# Patient Record
Sex: FEMALE | Race: OTHER | NOT HISPANIC OR LATINO | URBAN - METROPOLITAN AREA
[De-identification: names, ages, dates, MRNs, and addresses within clinical notes are randomized per-mention and may not be internally consistent; named-entity substitution may affect disease eponyms.]

---

## 2018-07-10 ENCOUNTER — EMERGENCY (EMERGENCY)
Facility: HOSPITAL | Age: 33
LOS: 1 days | Discharge: HOME | End: 2018-07-10
Attending: EMERGENCY MEDICINE | Admitting: EMERGENCY MEDICINE
Payer: COMMERCIAL

## 2018-07-10 VITALS
SYSTOLIC BLOOD PRESSURE: 121 MMHG | HEART RATE: 80 BPM | OXYGEN SATURATION: 100 % | DIASTOLIC BLOOD PRESSURE: 64 MMHG | RESPIRATION RATE: 20 BRPM | TEMPERATURE: 98 F

## 2018-07-10 DIAGNOSIS — R20.2 PARESTHESIA OF SKIN: ICD-10-CM

## 2018-07-10 DIAGNOSIS — R20.9 UNSPECIFIED DISTURBANCES OF SKIN SENSATION: ICD-10-CM

## 2018-07-10 PROCEDURE — 70450 CT HEAD/BRAIN W/O DYE: CPT | Mod: 26

## 2018-07-10 PROCEDURE — 99284 EMERGENCY DEPT VISIT MOD MDM: CPT | Mod: 25

## 2018-07-10 NOTE — ED ADULT TRIAGE NOTE - CHIEF COMPLAINT QUOTE
Walkin patient with complaints of left pain and intermittent numbness up to hip tonight. P/s had sclerotherapy done on 6/22 for varicose vein removal. +sensation, no skin discoloration. No difficulty ambulating.

## 2018-07-10 NOTE — ED PROVIDER NOTE - MEDICAL DECISION MAKING DETAILS
left sided paresthesia w/ dec sensation, strengths intact, no cerebellar findings, no hx of DM/HTN/HL, nonsmoker, will CT head, afebrile, no back pain, low suspicion for acute transverse myelitis, likely will need outpatient neurology follow up,

## 2018-07-10 NOTE — ED PROVIDER NOTE - PHYSICAL EXAMINATION
CON: ao x 3, HENMT: clear oropharynx, soft neck, HEAD: atraumatic, CV: rrr, RESP: cta b/l, SKIN: no rash, MSK: no deformities, NEURO: perrl, full EOMI, f-n normal, neg pronator, neg romberg, strength 5/5, normal gait, no dysmetria, no dysdiadochokinesia, reported dec sensation to left extremity and left face, able to differentiate sharp and dull objects but less sensitive on left side

## 2018-07-10 NOTE — ED ADULT NURSE NOTE - OBJECTIVE STATEMENT
pt is a 34 y/o female who comes into the ED c/o left leg numbness and tingling. pt states "I had sclerotherapy done on june 22nd and since wednesday I have noticed shooting pains from my left leg to my left hip, It also feels tingling and numb at times". pt denies any other symptoms, no swelling or discoloration noted.

## 2018-07-10 NOTE — ED PROVIDER NOTE - OBJECTIVE STATEMENT
33 yof pw paresthesia to left leg, from ankle and up.  first noted sx on Wednesday, felt improved, but recurred last night.  no weakness.  recent sclerotherapy for varicose vein.  no fc, no cough, no back pain, no focal weakness, no IVDU, no smoking, on OCP for years, no HA, no blurry vision, no recent travel/immobilization, no bloody stool.  states father recently dx w/ some neurologic condition w/ headache but no one else.  no bladder/bowel changes.  no recent URI sx.

## 2019-01-30 ENCOUNTER — EMERGENCY (EMERGENCY)
Facility: HOSPITAL | Age: 34
LOS: 1 days | Discharge: HOME | End: 2019-01-30
Attending: EMERGENCY MEDICINE | Admitting: EMERGENCY MEDICINE
Payer: COMMERCIAL

## 2019-01-30 VITALS
SYSTOLIC BLOOD PRESSURE: 112 MMHG | TEMPERATURE: 98 F | DIASTOLIC BLOOD PRESSURE: 75 MMHG | HEART RATE: 97 BPM | RESPIRATION RATE: 75 BRPM | OXYGEN SATURATION: 98 %

## 2019-01-30 PROCEDURE — 73080 X-RAY EXAM OF ELBOW: CPT | Mod: 26,LT

## 2019-01-30 PROCEDURE — 99283 EMERGENCY DEPT VISIT LOW MDM: CPT | Mod: 25

## 2019-01-30 NOTE — ED PROVIDER NOTE - MEDICAL DECISION MAKING DETAILS
LHD female with fall + L elbow injury.  Minimal edema without bony tenderness.  Normal distal neurovascular exam.  X-ray with possible occult radial head fracture.  Sling, ice, pain medication as needed.  Follow up with orthopedics if symptoms do not improve in one week
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
## 2019-01-30 NOTE — ED PROVIDER NOTE - CARE PROVIDER_API CALL
Kashmir Gupta)  Orthopaedic Surgery  [address] [place] [address]  Phone: [phone]  Fax: [phone]

## 2019-01-30 NOTE — ED PROVIDER NOTE - DIAGNOSTIC INTERPRETATION
ER Physician: Malcom Perla  INTERPRETATION:  no acute fracture; + soft tissue swelling noted; normal bony alignment.

## 2019-01-30 NOTE — ED PROVIDER NOTE - MUSCULOSKELETAL, MLM
LUE - no pain with wrist extension, supination or pronation.  Pain with flexion at the elbow.  Minimal edema around the elbow without bony tenderness.  No proximal humerus tenderness.  Normal distal motor and sensory of the median, ulnar and radial nerves.

## 2019-01-30 NOTE — ED PROVIDER NOTE - NSFOLLOWUPINSTRUCTIONS_ED_ALL_ED_FT
KEEP Sling IN PLACE FOR 7 DAYS FOR COMFORT AND SWELLING.   APPLY ICE FOR 15-20 MINUTES EVERY 1-2 HOURS FOR SWELLING AND COMFORT.   TAKE IBUPROFEN 600MG EVERY 6 HOURS WITH FOOD AS NEEDED FOR PAIN FOR 2-3 DAYS.   TAKE TYLENOL 650MG EVERY 6 HOURS AS NEEDED FOR PAIN FOR 2-3 DAYS.   RETURN TO ER FOR ANY NEW OR CONCERNING SYMPTOMS.   IF PAIN IS NOT IMPROVING IN ONE WEEK, FOLLOW UP WITH DR. LOONEY

## 2019-01-30 NOTE — ED ADULT NURSE NOTE - NSIMPLEMENTINTERV_GEN_ALL_ED
Implemented All Universal Safety Interventions:  [place] to call system. Call bell, personal items and telephone within reach. Instruct patient to call for assistance. Room bathroom lighting operational. Non-slip footwear when patient is off stretcher. Physically safe environment: no spills, clutter or unnecessary equipment. Stretcher in lowest position, wheels locked, appropriate side rails in place.
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
## 2019-02-03 DIAGNOSIS — M25.522 PAIN IN LEFT ELBOW: ICD-10-CM

## 2019-02-03 DIAGNOSIS — S59.902A UNSPECIFIED INJURY OF LEFT ELBOW, INITIAL ENCOUNTER: ICD-10-CM

## 2020-01-06 ENCOUNTER — OUTPATIENT (OUTPATIENT)
Dept: OUTPATIENT SERVICES | Facility: HOSPITAL | Age: 35
LOS: 1 days | Discharge: HOME | End: 2020-01-06
Payer: COMMERCIAL

## 2020-01-06 PROCEDURE — 88300 SURGICAL PATH GROSS: CPT | Mod: 26

## 2020-01-10 LAB — SURGICAL PATHOLOGY STUDY: SIGNIFICANT CHANGE UP

## 2021-07-13 ENCOUNTER — EMERGENCY (EMERGENCY)
Facility: HOSPITAL | Age: 36
LOS: 1 days | Discharge: HOME | End: 2021-07-13
Admitting: EMERGENCY MEDICINE
Payer: COMMERCIAL

## 2021-07-13 VITALS
HEART RATE: 81 BPM | RESPIRATION RATE: 16 BRPM | OXYGEN SATURATION: 98 % | DIASTOLIC BLOOD PRESSURE: 65 MMHG | SYSTOLIC BLOOD PRESSURE: 105 MMHG | HEIGHT: 66 IN | TEMPERATURE: 98 F | WEIGHT: 134.92 LBS
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
## 2021-07-13 DIAGNOSIS — S60.131A CONTUSION OF RIGHT MIDDLE FINGER WITH DAMAGE TO NAIL, INITIAL ENCOUNTER: ICD-10-CM

## 2021-07-13 DIAGNOSIS — M79.644 PAIN IN RIGHT FINGER(S): ICD-10-CM
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
## 2021-07-13 PROCEDURE — 99283 EMERGENCY DEPT VISIT LOW MDM: CPT | Mod: 25

## 2021-07-13 PROCEDURE — 73140 X-RAY EXAM OF FINGER(S): CPT | Mod: 26,RT

## 2021-07-13 PROCEDURE — 11740 EVACUATION SUBUNGUAL HMTMA: CPT

## 2021-07-13 NOTE — ED PROVIDER NOTE - CLINICAL SUMMARY MEDICAL DECISION MAKING FREE TEXT BOX
37 yo LHD, otherwise healthy F presents to the ED c/o R middle finger pain s/p slamming in car door last night. Pt has been treating pain without improvement. Pt with +subungual hematoma extending from nail fold residential up nail, with swelling and tenderness to R middle distal phalanx. Will obtain x-ray of digit; anticipate possible trephination.

## 2021-07-13 NOTE — ED PROVIDER NOTE - SKIN, MLM
manicure has grown out showing +subungual hematoma extending from nail fold intermediate up nail, nailfold intact, +tenderness to fingerpad and nail, skin otherwise intact. Capillary refill intact <2secs.

## 2021-07-13 NOTE — ED PROVIDER NOTE - MUSCULOSKELETAL, MLM
RUE: +swelling and tenderness to R middle distal phalanx RUE: +swelling and tenderness to R middle distal phalanx with small ecchymosis over finger pad

## 2021-07-13 NOTE — ED PROVIDER NOTE - NSFOLLOWUPINSTRUCTIONS_ED_ALL_ED_FT
YOU HAVE A SMALL INJURY TO THE NAIL BED WHICH WAS BLEEDING UNDER THE NAIL. WE POKED A SMALL HOLE IN THE NAIL WHICH RELEASED SOME OF THE BLOOD AND FLUID, RELEASING THE PRESSURE.     THE X RAY SHOWED NO SIGNS OF FRACTURE.     OKAY TO SOAK THE FINGER IN WARM WATER TO HELP KEEP IT DRAINING TODAY.     THEN APPLY ANTIBIOTIC OINTMENT AND A BANDAID ONCE IT STOPS DRAINING.     TAKE ANTIBIOTIC (DURICEF TWICE DAILY) AS PRESCRIBED TO PREVENT INFECTION OF THE AREA.     FOLLOW UP WITH YOUR PMD THIS WEEK AS PRESCRIBED.     Contusion in Adults    WHAT YOU NEED TO KNOW:    A contusion is a bruise that appears on your skin after an injury. A bruise happens when small blood vessels tear but skin does not. When blood vessels tear, blood leaks into nearby tissue, such as soft tissue or muscle.    DISCHARGE INSTRUCTIONS:    Return to the emergency department if:     You have new trouble moving the injured area.      You have tingling or numbness in or near the injured area.      Your hand or foot below the bruise gets cold or turns pale.     Contact your healthcare provider if:     You find a new lump in the injured area.      Your symptoms do not improve with treatment after 4 to 5 days.      You have questions or concerns about your condition or care.    Medicines: You may need any of the following:     NSAIDs help decrease swelling and pain or fever. This medicine is available with or without a doctor's order. NSAIDs can cause stomach bleeding or kidney problems in certain people. If you take blood thinner medicine, always ask your healthcare provider if NSAIDs are safe for you. Always read the medicine label and follow directions.      Prescription pain medicine may be given. Do not wait until the pain is severe before you take your medicine.      Take your medicine as directed. Contact your healthcare provider if you think your medicine is not helping or if you have side effects. Tell him of her if you are allergic to any medicine. Keep a list of the medicines, vitamins, and herbs you take. Include the amounts, and when and why you take them. Bring the list or the pill bottles to follow-up visits. Carry your medicine list with you in case of an emergency.    Follow up with your healthcare provider as directed: You may need to return within a week to check your injury again. Write down your questions so you remember to ask them during your visits.    Help a contusion heal:     Rest the injured area or use it less than usual. If you bruised your leg or foot, you may need crutches or a cane to help you walk. This will help you keep weight off your injured body part.       Apply ice to decrease swelling and pain. Ice may also help prevent tissue damage. Use an ice pack, or put crushed ice in a plastic bag. Cover it with a towel and place it on your bruise for 15 to 20 minutes every hour or as directed.      Use compression to support the area and decrease swelling. Wrap an elastic bandage around the area over the bruised muscle. Make sure the bandage is not too tight. You should be able to fit 1 finger between the bandage and your skin.      Elevate (raise) your injured body part above the level of your heart to help decrease pain and swelling. Use pillows, blankets, or rolled towels to elevate the area as often as you can.      Do not drink alcohol as directed. Alcohol may slow healing.      Do not stretch injured muscles right after your injury. Ask your healthcare provider when and how you may safely stretch after your injury. Gentle stretches can help increase your flexibility.      Do not massage the area or put heating pads on the bruise right after your injury. Heat and massage may slow healing. Your healthcare provider may tell you to apply heat after several days. At that time, heat will start to help the injury heal.    Prevent another contusion:     Stretch and warm up before you play sports or exercise.      Wear protective gear when you play sports. Examples are shin guards and padding.       If you begin a new physical activity, start slowly to give your body a chance to adjust.

## 2021-07-13 NOTE — ED PROVIDER NOTE - PATIENT PORTAL LINK FT
You can access the FollowMyHealth Patient Portal offered by [facility] by registering at the following website: [email or web address]. By joining [facility]’s FollowMyHealth portal, you will also be able to view your health information using other applications (apps) compatible with our system.

## 2021-07-13 NOTE — ED PROVIDER NOTE - OBJECTIVE STATEMENT
37 yo LHD, otherwise healthy F presents to the ED c/o R middle finger pain s/p slamming in car door last night. Pt notes pain increased overnight and the nail is now purple. Pt has gel manicure and is unable to remove. Pt notes injury occurred 6PM yesterday. Pt has been icing the finger without improvement. Pt denies numbness/tingling/weakness. Tetanus is up to date.

## 2021-07-13 NOTE — ED ADULT NURSE NOTE - OBJECTIVE STATEMENT
35 yo F presents to ED c.o right 3rd digit injury. Pt state "I closed my finger in a car door last night. I applied ice and kept it elevated in hopes it would be fine but I couldn't sleep from the pain last night and today the finger is bruised".

## 2022-01-04 ENCOUNTER — EMERGENCY (EMERGENCY)
Facility: HOSPITAL | Age: 37
LOS: 1 days | Discharge: LEFT BEFORE DISCHARGE | End: 2022-01-04
Admitting: EMERGENCY MEDICINE
Payer: COMMERCIAL

## 2022-01-04 VITALS
TEMPERATURE: 98 F | HEIGHT: 66 IN | SYSTOLIC BLOOD PRESSURE: 110 MMHG | RESPIRATION RATE: 18 BRPM | OXYGEN SATURATION: 98 % | HEART RATE: 76 BPM | DIASTOLIC BLOOD PRESSURE: 73 MMHG

## 2022-01-04 DIAGNOSIS — Z53.21 PROCEDURE AND TREATMENT NOT CARRIED OUT DUE TO PATIENT LEAVING PRIOR TO BEING SEEN BY HEALTH CARE PROVIDER: ICD-10-CM

## 2022-01-04 PROCEDURE — L9991: CPT

## 2023-05-06 ENCOUNTER — EMERGENCY (EMERGENCY)
Facility: HOSPITAL | Age: 38
LOS: 1 days | Discharge: HOME | End: 2023-05-06
Admitting: EMERGENCY MEDICINE
Payer: COMMERCIAL

## 2023-05-06 VITALS
WEIGHT: 134.92 LBS | RESPIRATION RATE: 18 BRPM | HEART RATE: 99 BPM | TEMPERATURE: 98 F | OXYGEN SATURATION: 97 % | DIASTOLIC BLOOD PRESSURE: 72 MMHG | SYSTOLIC BLOOD PRESSURE: 112 MMHG
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
## 2023-05-06 DIAGNOSIS — S92.512A DISPLACED FRACTURE OF PROXIMAL PHALANX OF LEFT LESSER TOE(S), INITIAL ENCOUNTER FOR CLOSED FRACTURE: ICD-10-CM

## 2023-05-06 DIAGNOSIS — M79.675 PAIN IN LEFT TOE(S): ICD-10-CM

## 2023-05-06 PROCEDURE — 99285 EMERGENCY DEPT VISIT HI MDM: CPT

## 2023-05-06 PROCEDURE — 73660 X-RAY EXAM OF TOE(S): CPT | Mod: 26,LT

## 2023-05-06 NOTE — ED PROVIDER NOTE - OBJECTIVE STATEMENT
36 y/o F with no PMH presents to the ED for L 5th toe pain. Pt reports she began having pain after kicking a box. She denies numbness, tingling, weakness, or any additional injuries.

## 2023-05-06 NOTE — ED PROVIDER NOTE - PATIENT PORTAL LINK FT
You can access the FollowMyHealth Patient Portal offered by [facility] by registering at the following website: [email or web address]. By joining [facility]’s FollowMyHealth portal, you will also be able to view your health information using other applications (apps) compatible with our system.

## 2023-05-06 NOTE — ED PROVIDER NOTE - CLINICAL SUMMARY MEDICAL DECISION MAKING FREE TEXT BOX
Pt presenting with L 5th toe pain. Plan for XR imaging to r/o Fx. Will reassess clinically after results have been obtained. Pt presenting with L 5th toe pain. Plan for XR imaging to r/o Fx. Will reassess clinically after results have been obtained.    Patient with spiral fracture left pinky toe. ortho involved advised attempt reduce deformity. Reduction with chayito taping done.   Patient will be given boot and crutches. Will see ortho in office.

## 2023-09-08 NOTE — ED PROCEDURE NOTE - CPROC ED SITE PREP1
A (CATHETER 6FR PGTL CRV 110CM WIRE BRAID ROBUST SHAFT EXPO) catheter was used to cross the aortic valve and placed in the left ventricle.  LV pressure was recorded and measured. povidone iodine

## 2024-12-10 NOTE — ED ADULT TRIAGE NOTE - DOMESTIC TRAVEL HIGH RISK QUESTION
Date of Service:  12/09/2024    Comprehensive medical neuropsychiatric 25-minute telephonic followup evaluation    SERVICE CODE:  [id number].    ENCOUNTER DIAGNOSIS:  F31.30, bipolar depression.    This is a 25-minute telephonic followup evaluation performed, December 9, 2024    SUBJECTIVE:  April is very pleasant and interactive in this medical neuropsychiatric followup encounter.  She shares that she is doing very well.  The severe dysphoria, anhedonia, melancholia, and wide mood lability that troubled her so significantly in the past are well controlled at present.    MENTAL STATUS EXAMINATION:  Reveals no signs of thought disorder, psychosis, nor of organic mental disease.  Mood is euthymic and well modulated.  Affect is appropriate to content.  April denies suicidal, homicidal, and all other violent ideation on exam.    REVIEW OF SYSTEMS:  Fourteen-point medical review of systems is negative or noncontributory aside from that described above.    SOCIAL HISTORY:  Alcohol and other drug abuse are denied.    ASSESSMENT AND PLAN:  April is provided a comprehensive medical neuropsychiatric medication regimen as thoroughly reviewed, discussed, adjusted, and prescribed as per Epic.  Continue active care and treatment plan.  Return to clinic for followup.        Dictated By:  Mendel Whitman MD  Signing Provider:  Mendel Whitman MD    [email or web address]  D:  12/09/2024 03:55:40 PM  T:  12/09/2024 08:21:21 PM  Job:  [id number]       
No